# Patient Record
Sex: MALE | Race: BLACK OR AFRICAN AMERICAN | Employment: UNEMPLOYED | URBAN - METROPOLITAN AREA
[De-identification: names, ages, dates, MRNs, and addresses within clinical notes are randomized per-mention and may not be internally consistent; named-entity substitution may affect disease eponyms.]

---

## 2019-07-06 ENCOUNTER — APPOINTMENT (OUTPATIENT)
Dept: CT IMAGING | Age: 49
End: 2019-07-06
Attending: EMERGENCY MEDICINE
Payer: MEDICAID

## 2019-07-06 ENCOUNTER — HOSPITAL ENCOUNTER (EMERGENCY)
Age: 49
Discharge: PSYCHIATRIC OR SUBSTANCE USE FACILITY | End: 2019-07-07
Attending: EMERGENCY MEDICINE
Payer: MEDICAID

## 2019-07-06 DIAGNOSIS — T07.XXXA ABRASIONS OF MULTIPLE SITES: ICD-10-CM

## 2019-07-06 DIAGNOSIS — R45.851 SUICIDE IDEATION: ICD-10-CM
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
## 2019-07-06 LAB
ANION GAP BLD CALC-SCNC: 19 MMOL/L (ref 10–20)
BUN BLD-MCNC: 16 MG/DL (ref 7–18)
CA-I BLD-MCNC: 1.14 MMOL/L (ref 1.12–1.32)
CHLORIDE BLD-SCNC: 109 MMOL/L (ref 100–108)
CO2 BLD-SCNC: 20 MMOL/L (ref 19–24)
CREAT UR-MCNC: 1.2 MG/DL (ref 0.6–1.3)
GLUCOSE BLD STRIP.AUTO-MCNC: 113 MG/DL (ref 74–106)
HCT VFR BLD CALC: 32 % (ref 36–49)
HGB BLD-MCNC: 10.9 G/DL (ref 12–16)
POTASSIUM BLD-SCNC: 3.5 MMOL/L (ref 3.5–5.5)
SODIUM BLD-SCNC: 144 MMOL/L (ref 136–145)

## 2019-07-06 PROCEDURE — 99284 EMERGENCY DEPT VISIT MOD MDM: CPT

## 2019-07-06 PROCEDURE — 80047 BASIC METABLC PNL IONIZED CA: CPT

## 2019-07-06 PROCEDURE — 70450 CT HEAD/BRAIN W/O DYE: CPT

## 2019-07-06 PROCEDURE — 72125 CT NECK SPINE W/O DYE: CPT

## 2019-07-06 PROCEDURE — 71260 CT THORAX DX C+: CPT

## 2019-07-06 PROCEDURE — 80307 DRUG TEST PRSMV CHEM ANLYZR: CPT

## 2019-07-06 PROCEDURE — 74011636320 HC RX REV CODE- 636/320: Performed by: EMERGENCY MEDICINE

## 2019-07-06 PROCEDURE — 99285 EMERGENCY DEPT VISIT HI MDM: CPT

## 2019-07-06 PROCEDURE — 85025 COMPLETE CBC W/AUTO DIFF WBC: CPT

## 2019-07-06 RX ADMIN — IOPAMIDOL 100 ML: 612 INJECTION, SOLUTION INTRAVENOUS at 23:09

## 2019-07-07 ENCOUNTER — HOSPITAL ENCOUNTER (INPATIENT)
Age: 49
LOS: 3 days | Discharge: HOME | DRG: 750 | End: 2019-07-10
Attending: PSYCHIATRY & NEUROLOGY | Admitting: PSYCHIATRY & NEUROLOGY
Payer: COMMERCIAL

## 2019-07-07 VITALS
BODY MASS INDEX: 26.66 KG/M2 | WEIGHT: 180 LBS | TEMPERATURE: 97.1 F | HEIGHT: 69 IN | DIASTOLIC BLOOD PRESSURE: 78 MMHG | SYSTOLIC BLOOD PRESSURE: 122 MMHG | HEART RATE: 63 BPM | OXYGEN SATURATION: 99 % | RESPIRATION RATE: 16 BRPM

## 2019-07-07 LAB
AMPHET UR QL SCN: NEGATIVE
BARBITURATES UR QL SCN: NEGATIVE
BASOPHILS # BLD: 0 K/UL (ref 0–0.1)
BASOPHILS NFR BLD: 0 % (ref 0–2)
BENZODIAZ UR QL: NEGATIVE
CANNABINOIDS UR QL SCN: POSITIVE
COCAINE UR QL SCN: POSITIVE
DIFFERENTIAL METHOD BLD: ABNORMAL
EOSINOPHIL # BLD: 0.1 K/UL (ref 0–0.4)
EOSINOPHIL NFR BLD: 2 % (ref 0–5)
ERYTHROCYTE [DISTWIDTH] IN BLOOD BY AUTOMATED COUNT: 13.7 % (ref 11.6–14.5)
ETHANOL SERPL-MCNC: 46 MG/DL (ref 0–3)
HCT VFR BLD AUTO: 30.4 % (ref 36–48)
HDSCOM,HDSCOM: ABNORMAL
HGB BLD-MCNC: 10.1 G/DL (ref 13–16)
LYMPHOCYTES # BLD: 2 K/UL (ref 0.9–3.6)
LYMPHOCYTES NFR BLD: 28 % (ref 21–52)
MCH RBC QN AUTO: 28.9 PG (ref 24–34)
MCHC RBC AUTO-ENTMCNC: 33.2 G/DL (ref 31–37)
MCV RBC AUTO: 86.9 FL (ref 74–97)
METHADONE UR QL: NEGATIVE
MONOCYTES # BLD: 0.8 K/UL (ref 0.05–1.2)
MONOCYTES NFR BLD: 11 % (ref 3–10)
NEUTS SEG # BLD: 4.2 K/UL (ref 1.8–8)
NEUTS SEG NFR BLD: 59 % (ref 40–73)
OPIATES UR QL: NEGATIVE
PCP UR QL: NEGATIVE
PLATELET # BLD AUTO: 272 K/UL (ref 135–420)
PMV BLD AUTO: 8.7 FL (ref 9.2–11.8)
RBC # BLD AUTO: 3.5 M/UL (ref 4.7–5.5)
WBC # BLD AUTO: 7.1 K/UL (ref 4.6–13.2)

## 2019-07-07 PROCEDURE — 65220000005 HC RM SEMIPRIVATE PSYCH 3 OR 4 BED

## 2019-07-07 PROCEDURE — 74011250637 HC RX REV CODE- 250/637: Performed by: EMERGENCY MEDICINE

## 2019-07-07 RX ORDER — HALOPERIDOL 5 MG/ML
5 INJECTION INTRAMUSCULAR
Status: DISCONTINUED | OUTPATIENT
Start: 2019-07-07 | End: 2019-07-10 | Stop reason: HOSPADM

## 2019-07-07 RX ORDER — IBUPROFEN 600 MG/1
600 TABLET ORAL
Status: DISCONTINUED | OUTPATIENT
Start: 2019-07-07 | End: 2019-07-10 | Stop reason: HOSPADM

## 2019-07-07 RX ORDER — ACETAMINOPHEN 325 MG/1
650 TABLET ORAL
Status: COMPLETED | OUTPATIENT
Start: 2019-07-07 | End: 2019-07-07

## 2019-07-07 RX ORDER — ACETAMINOPHEN 500 MG
1000 TABLET ORAL
Status: COMPLETED | OUTPATIENT
Start: 2019-07-07 | End: 2019-07-07

## 2019-07-07 RX ORDER — HYDROXYZINE PAMOATE 50 MG/1
50 CAPSULE ORAL
Status: DISCONTINUED | OUTPATIENT
Start: 2019-07-07 | End: 2019-07-10 | Stop reason: HOSPADM

## 2019-07-07 RX ORDER — HALOPERIDOL 5 MG/1
5 TABLET ORAL
Status: DISCONTINUED | OUTPATIENT
Start: 2019-07-07 | End: 2019-07-10 | Stop reason: HOSPADM

## 2019-07-07 RX ORDER — TRAZODONE HYDROCHLORIDE 50 MG/1
50 TABLET ORAL
Status: DISCONTINUED | OUTPATIENT
Start: 2019-07-07 | End: 2019-07-08

## 2019-07-07 RX ADMIN — ACETAMINOPHEN 1000 MG: 500 TABLET, FILM COATED ORAL at 00:38

## 2019-07-07 RX ADMIN — ACETAMINOPHEN 650 MG: 325 TABLET, FILM COATED ORAL at 13:44

## 2019-07-07 NOTE — PROGRESS NOTES
Spoke with Sarah Edouard from [facility] will review and awaiting Psych note. Per Sarah Edouard limited staffing at their facility. Spoke with [facility] VB will review faxed information. Spoke with Mandy Mcconnell will review faxed information. Spoke with Juanpablo and they are at [facility] with Ganesh Amaya she stated that they would be able to take him , but at this time she has had a plumbing issue arise at the facility and they ae on hold for admissions until the issue is resolved. Spoke with JARED Joao Chol and they will come to evaluate for CSU    [facility] is at bedside to evaluate for Earlis Cranker stated they are full at this time. Spoke with patient and he is willing to be submitted out to the [facility] if no bed can be found locally or if not a candidate for CSU. Per [facility] evaluation patient is not a candidate for CSU at this time. Patient remains Voluntary. [address] for [employer], [employer] and [place] to review and let us known. Received call from [facility] and their plumbing issue has been resolved and they will accept the patient. Accepting MD is Dr. Mariella Hardy , Patient is to go to Room 106 bed 2 on the [facility] unit.  Nurse to call report to [phone]
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
## 2019-07-07 NOTE — ED NOTES
Patient was for discharge and patient was being discharged and then told nurse that he was suicidal, patient in paper scrubs, sitter at bedside, tele psych in seeing miko t, patient alert and oriented x 4, POC discussed with patient
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
## 2019-07-07 NOTE — ED NOTES
Yuliet Grover from [facility] here to see patient, patient  is volentary, not able to go to crisis stablilization
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
## 2019-07-07 NOTE — ED NOTES
TRANSFER - OUT REPORT:    Verbal report given toKim Ayaan Escamilla RN(name) on Shane Manley  being transferred to [facility]) for routine progression of care       Report consisted of patients Situation, Background, Assessment and   Recommendations(SBAR). Information from the following report(s) Kardex, ED Summary, OR Summary, Intake/Output and MAR was reviewed with the receiving nurse. Lines:   Peripheral IV 07/06/19 Right Forearm (Active)        Opportunity for questions and clarification was provided.       Patient transported with: Veronica Goel 308

## 2019-07-08 ENCOUNTER — APPOINTMENT (OUTPATIENT)
Dept: GENERAL RADIOLOGY | Age: 49
DRG: 750 | End: 2019-07-08
Attending: PSYCHIATRY & NEUROLOGY
Payer: COMMERCIAL

## 2019-07-08 PROBLEM — F32.A DEPRESSION: Status: ACTIVE | Noted: 2019-07-08

## 2019-07-08 PROBLEM — F20.9 SCHIZOPHRENIA (HCC): Chronic | Status: ACTIVE | Noted: 2019-07-08

## 2019-07-08 PROCEDURE — 74011250637 HC RX REV CODE- 250/637: Performed by: PSYCHIATRY & NEUROLOGY

## 2019-07-08 PROCEDURE — 65220000005 HC RM SEMIPRIVATE PSYCH 3 OR 4 BED

## 2019-07-08 PROCEDURE — 73060 X-RAY EXAM OF HUMERUS: CPT

## 2019-07-08 RX ORDER — MIRTAZAPINE 15 MG/1
15 TABLET, FILM COATED ORAL
Status: DISCONTINUED | OUTPATIENT
Start: 2019-07-08 | End: 2019-07-10 | Stop reason: HOSPADM

## 2019-07-08 RX ORDER — QUETIAPINE FUMARATE 300 MG/1
300 TABLET, FILM COATED ORAL
Status: DISCONTINUED | OUTPATIENT
Start: 2019-07-08 | End: 2019-07-10 | Stop reason: HOSPADM

## 2019-07-08 RX ORDER — TRAZODONE HYDROCHLORIDE 50 MG/1
50 TABLET ORAL
Status: DISCONTINUED | OUTPATIENT
Start: 2019-07-08 | End: 2019-07-10 | Stop reason: HOSPADM

## 2019-07-08 RX ADMIN — MIRTAZAPINE 15 MG: 15 TABLET, FILM COATED ORAL at 20:09

## 2019-07-08 RX ADMIN — QUETIAPINE FUMARATE 300 MG: 300 TABLET ORAL at 20:09

## 2019-07-08 RX ADMIN — HYDROXYZINE PAMOATE 50 MG: 50 CAPSULE ORAL at 14:44

## 2019-07-08 RX ADMIN — IBUPROFEN 600 MG: 600 TABLET ORAL at 07:08

## 2019-07-08 RX ADMIN — IBUPROFEN 600 MG: 600 TABLET ORAL at 14:44

## 2019-07-08 RX ADMIN — IBUPROFEN 600 MG: 600 TABLET ORAL at 20:10

## 2019-07-08 RX ADMIN — TRAZODONE HYDROCHLORIDE 50 MG: 50 TABLET ORAL at 20:09

## 2019-07-08 NOTE — BH NOTES
GROUP THERAPY PROGRESS NOTE    Sherrill Momin is participating in [place] [facility]. Group time: 15 minutes    Personal goal for participation:   Orientation to the unit    Goal orientation: community    Therapeutic interventions reviewed and discussed:   Unit guidelines and daily routine were reviewed. Patients were encouraged to fill out their daily menus and set a daily goal.   Patients were given the opportunity to ask questions and voice their concerns about the unit. Impression of participation:   Andrea Loera appeared attentive during [place]. He did not have any questions or concerns about the unit.
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
## 2019-07-08 NOTE — BH NOTES
Wound on left side of pt head was cleaned and re-bandaged w/ dry dressing. Abrasion was assessed, no s/s of infection. Abrasion was cleaned with wound .   Will continue to monitor

## 2019-07-08 NOTE — BSMART NOTE
OCCUPATIONAL THERAPY PROGRESS NOTE Group Time:  1300 Attendance: The patient attended 1/3 of group. The patient left and returned to activity at least once. Called out and returned. Participation: The patient participated with minimal elaboration in the activity. Attention: The patient was able to focus on the activity. Monserrat Thomas Participated as called on.,  Answers appropriate. Stated he needed to work on \"Don't sweat the small stuff\" and discussed this briefly.

## 2019-07-08 NOTE — BSMART NOTE
SW assessment/Intervention:  Patient is currently not available and is away from the unit for testing. SW will continue to follow up with patient as needed. Kimberly Harris LCSW-SUSANNAH

## 2019-07-08 NOTE — BH NOTES
MHT NOTE: The pt has been in his room appearing to be asleep and has been observed out in the day area quiet and sitting to himself, although very little. The pt attended breakfast, lunch and two groups. He has complied with performing his ADL's, abiding by the unit rules and utilizing the non-skid footwear that was provided for his safety. He did not experience any falls. He did ask this writer if he can discharge and he was advised that he would have to go through the nurse and the doctor. The pt has not voiced any thoughts of SI, HI and has not experienced A/VH. He will continue to be monitored for his behavior, location and safety for the remaining duration of this shift.

## 2019-07-09 PROCEDURE — 74011250637 HC RX REV CODE- 250/637: Performed by: PSYCHIATRY & NEUROLOGY

## 2019-07-09 PROCEDURE — 65220000005 HC RM SEMIPRIVATE PSYCH 3 OR 4 BED

## 2019-07-09 RX ORDER — LISINOPRIL 5 MG/1
5 TABLET ORAL DAILY
Status: DISCONTINUED | OUTPATIENT
Start: 2019-07-09 | End: 2019-07-10

## 2019-07-09 RX ADMIN — IBUPROFEN 600 MG: 600 TABLET ORAL at 18:05

## 2019-07-09 RX ADMIN — QUETIAPINE FUMARATE 300 MG: 300 TABLET ORAL at 20:17

## 2019-07-09 RX ADMIN — IBUPROFEN 600 MG: 600 TABLET ORAL at 07:08

## 2019-07-09 RX ADMIN — TRAZODONE HYDROCHLORIDE 50 MG: 50 TABLET ORAL at 20:17

## 2019-07-09 RX ADMIN — LISINOPRIL 5 MG: 5 TABLET ORAL at 17:48

## 2019-07-09 RX ADMIN — MULTIPLE VITAMINS W/ MINERALS TAB 1 TABLET: TAB at 08:13

## 2019-07-09 RX ADMIN — MIRTAZAPINE 15 MG: 15 TABLET, FILM COATED ORAL at 20:17

## 2019-07-09 RX ADMIN — IBUPROFEN 600 MG: 600 TABLET ORAL at 13:00
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
## 2019-07-09 NOTE — PROGRESS NOTES
Problem: Falls - Risk of  Goal: *Absence of Falls  Description  Document Milta Raring Fall Risk and appropriate interventions in the flowsheet daily. Outcome: Progressing Towards Goal     Problem: Suicide/Homicide (Adult/Pediatric)  Goal: *STG: Remains safe in hospital  Description  Patient will be assessed daily for safety. Outcome: Progressing Towards Goal  Goal: *STG: Seeks staff when feelings of self harm or harm towards others arise  Description  Patient will be assessed daily for suicidal thoughts and contract for safety. Outcome: Progressing Towards Goal  Goal: *STG/LTG: Complies with medication therapy  Description  Patient will take prescribed medications daily. Outcome: Progressing Towards Goal   Patient denies suicidal ideations. States he is not hearing voices. Medication compliant. Attend select groups. Dressings changed to forehead and knee.
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
## 2019-07-09 NOTE — BH NOTES
GROUP THERAPY PROGRESS NOTE    Collin Granados is participating in Leisure Activity Group.      Group time: 39 Minutes     Personal goal for participation: To find time as a free time and being creative. Improve self-esteem, be more confident and experience new things and new activities. Develop positive social interaction skills, develop friendships,improve social skills and to be active.      Goal orientation: Social     Group therapy participation: Active     Therapeutic interventions reviewed and discussed: The importance of having a leisure creativity and the quality of spending free time as a group participating in different activities or just an individual (a person) relaxing and enjoying leisure.     Impression of participation: Patient has fully participated.

## 2019-07-09 NOTE — BH NOTES
GROUP THERAPY PROGRESS NOTE    Sherrill Momin is participating in [place].      Group time: 45 minutes    Personal goal for participation: \"be positive, get place to stay\"    Goal orientation: personal    Group therapy participation: active    Therapeutic interventions reviewed and discussed: discuss daily Tx goal(s); discuss guideline compliance, unit issues and community announcements

## 2019-07-09 NOTE — PROGRESS NOTES
Behavioral Health Progress Note    Admit Date: 7/7/2019  Hospital day 1    Vitals :   Patient Vitals for the past 8 hrs:   BP Temp Pulse Resp   07/09/19 0730 (!) 147/96 97.6 °F (36.4 °C) 72 18     Labs:  No results found for this or any previous visit (from the past 24 hour(s)). Meds:   Current Facility-Administered Medications   Medication Dose Route Frequency    traZODone (DESYREL) tablet 50 mg  50 mg Oral QHS    mirtazapine (REMERON) tablet 15 mg  15 mg Oral QHS    QUEtiapine (SEROquel) tablet 300 mg  300 mg Oral QHS    multivitamin, tx-iron-ca-min (THERA-M w/ IRON) tablet 1 Tab  1 Tab Oral DAILY    haloperidol lactate (HALDOL) injection 5 mg  5 mg IntraMUSCular Q6H PRN    haloperidol (HALDOL) tablet 5 mg  5 mg Oral Q6H PRN    hydrOXYzine pamoate (VISTARIL) capsule 50 mg  50 mg Oral Q4H PRN    ibuprofen (MOTRIN) tablet 600 mg  600 mg Oral Q6H PRN      Hospital Problems: Principal Problem:    Schizophrenia ([facility] [address] (7/8/2019)        Subjective:   Medication side effects: none  none    Mental Status Exam  Sensorium: alert  Orientation: only aware of  time, place and person  Relations: cooperative  Eye Contact: appropriate  Appearance: shows no evidence of impairment  Thought Process: normal rate of thoughts   Thought Content: no evidence of impairment   Suicidal: denies   Homicidal: none   Mood: is euthymic   Affect: stable  Memory: shows no evidence of impairment     Concentration: intact  Abstraction: concrete  Insight: The patient shows little insight    OR Fair  Judgement: is psychologically impaired OR  Fair    Assessment/Plan:   not changed    Pt seen by wound care nurse. Says he does not have sutures, more abrasions and lacerations. Not infected. Recommended cleanse with soap and water and dress. Knee is a little stiff. No confusion. He talked to his payee. No money in account until Aug 1. They paid his July rent in [facility] where he had been. Denies halluc, delusions, HI or SI.  Says he wants list of rooming houses in area. Continue meds as is.

## 2019-07-09 NOTE — BSMART NOTE
OCCUPATIONAL THERAPY PROGRESS NOTE Group Time:  1267 Attendance: The patient attended full group. Cristóbal Nobles Participation: The patient participated with minimal elaboration in the activity. . 
Attention: The patient needed redirection to activity at least once. Cristóbal Nobles Interaction: The patient acknowledges others or responds to questions,  with no spontaneous interaction. Responds to questions with minimal spontaneous elaboration. Responses on subject and appropriate to question.

## 2019-07-09 NOTE — BSMART NOTE
ART THERAPY GROUP PROGRESS NOTE PATIENT SCHEDULED FOR GROUP AT: 10:00 
 
ATTENDANCE: 1/2 PARTICIPATION LEVEL: Participates fully in the art process ATTENTION LEVEL : Able to focus on task FOCUS: Anxiety reduction/ mindfulness SYMBOLIC & THEMATIC CONTENT AS NOTED IN IMAGERY: He was calm, compliant, and alert. His thought process was linear and associations logical. He followed directives accordingly and kept to himself unless directly prompted. He was called out for half of group to re-dress his bandages.

## 2019-07-09 NOTE — BH NOTES
GROUP THERAPY PROGRESS NOTE    Dara Borges is participating in Coping skills educational group. Group time: 30 minutes    Personal goal for participation: Identify at least 2 coping skills to utilize with increased stressors. Goal orientation: community    Group therapy participation: active    Therapeutic interventions reviewed and discussed: Identifying coping skills to utilize when presented with increased stressors.       Impression of participation: calm

## 2019-07-09 NOTE — BH NOTES
Patient spent most of her day in bed resting, she participated in group today,she  ate only 10% of her dinner took her medicine and returned to her room. Staff will continue patient for safety.

## 2019-07-09 NOTE — BSMART NOTE
SOCIAL WORK GROUP THERAPY PROGRESS NOTE Time: 4:00 Group Topic: DISCHARGE PLANNING AND MAINTAINING STABILITY IN THE COMMUNITY Group Participation: SW encouraged pt to participate in group. \" I went to group earlier today\". LEIDY informed pt , the group was different  Form the one he attended earlier. Pt.  Still declined.

## 2019-07-09 NOTE — WOUND CARE
Physical Exam  
Room 106/02: wound assess Wound Head Anterior; Left Abrasion  (Active) 3 abrasions: 2.5x1x0.1 cm; 3.5x2x0.1cm; 4x2x0.1cm. POA Dressing Status Intact; Removed 7/9/2019 10:55 AM  
Dressing Type 4 x 4;Transparent film 7/9/2019 10:55 AM  
Non-staged Wound Description Partial thickness 7/9/2019 10:55 AM  
Shape irregular 7/9/2019 10:55 AM  
Condition of Base [place] 7/9/2019 10:55 AM  
Condition of Edges Closed 7/9/2019 10:55 AM  
Epithelialization (%) 0 7/9/2019 10:55 AM  
Assessment Dark edges 7/9/2019 10:55 AM  
Tissue Type Percent Pink 100 7/9/2019 10:55 AM  
Drainage Amount Small 7/9/2019 10:55 AM  
Drainage Color Serosanguinous 7/9/2019 10:55 AM  
Wound Odor None 7/9/2019 10:55 AM  
Deyanira-wound Assessment Intact 7/9/2019 10:55 AM  
Margins Attached edges; Defined edges 7/9/2019 10:55 AM  
Dressing Changed Changed/New 7/9/2019 10:55 AM  
Dressing Type Applied Silicone 1/0/6399 18:93 AM  
Procedure Tolerated Well 7/9/2019 10:55 AM  
Number of days: 3 Wound Knee Left abrasion (Active) POA Dressing Status Intact; Removed 7/9/2019 10:55 AM  
Dressing Type Non adherent 7/9/2019 10:55 AM  
Non-staged Wound Description Partial thickness 7/9/2019 10:55 AM  
Shape circular 7/9/2019 10:55 AM  
Wound Length (cm) 7 cm 7/9/2019 10:55 AM  
Wound Width (cm) 4 cm 7/9/2019 10:55 AM  
Wound Depth (cm) 0.1 cm 7/9/2019 10:55 AM  
Wound Volume (cm^3) 2.8 cm^3 7/9/2019 10:55 AM  
Condition of Base Pink;Purple 7/9/2019 10:55 AM  
Condition of Edges Closed 7/9/2019 10:55 AM  
Epithelialization (%) 0 7/9/2019 10:55 AM  
Assessment Pink; purple 7/9/2019 10:55 AM  
Tissue Type Percent Maroon/Purple 50 % 7/9/2019 10:55 AM  
Tissue Type Percent Pink 50 7/9/2019 10:55 AM  
Drainage Amount Small 7/9/2019 10:55 AM  
Drainage Color Yellow 7/9/2019 10:55 AM  
Wound Odor None 7/9/2019 10:55 AM  
Deyanira-wound Assessment Intact 7/9/2019 10:55 AM  
Margins Attached edges; Defined edges 7/9/2019 10:55 AM  
 Dressing Changed Changed/New 7/9/2019 10:55 AM  
Dressing Type Applied Silicone 9/0/0951 73:62 AM  
Procedure Tolerated Well 7/9/2019 10:55 AM  
Number of days: 3 Recommend pt shower with next dressing change. mepilex dressings applied, education provided to pt & primary nurse Stephany Coyle RN at this time. Will turn over care to nursing staff at this time.  
Joseph JAQUEZN, RN, [facility], [address]

## 2019-07-10 VITALS
TEMPERATURE: 97.4 F | RESPIRATION RATE: 16 BRPM | WEIGHT: 175 LBS | SYSTOLIC BLOOD PRESSURE: 141 MMHG | BODY MASS INDEX: 23.7 KG/M2 | DIASTOLIC BLOOD PRESSURE: 89 MMHG | HEART RATE: 62 BPM | HEIGHT: 72 IN

## 2019-07-10 PROCEDURE — 74011250637 HC RX REV CODE- 250/637: Performed by: PSYCHIATRY & NEUROLOGY

## 2019-07-10 RX ORDER — MIRTAZAPINE 15 MG/1
15 TABLET, FILM COATED ORAL
Qty: 14 TAB | Refills: 0 | Status: SHIPPED | OUTPATIENT
Start: 2019-07-10

## 2019-07-10 RX ORDER — QUETIAPINE FUMARATE 300 MG/1
300 TABLET, FILM COATED ORAL
Qty: 14 TAB | Refills: 0 | Status: SHIPPED | OUTPATIENT
Start: 2019-07-10

## 2019-07-10 RX ORDER — DIPHENHYDRAMINE HCL 25 MG
25 CAPSULE ORAL
Status: COMPLETED | OUTPATIENT
Start: 2019-07-10 | End: 2019-07-10

## 2019-07-10 RX ORDER — TRAZODONE HYDROCHLORIDE 50 MG/1
50 TABLET ORAL
Qty: 14 TAB | Refills: 0 | Status: SHIPPED | OUTPATIENT
Start: 2019-07-10

## 2019-07-10 RX ADMIN — IBUPROFEN 600 MG: 600 TABLET ORAL at 02:23

## 2019-07-10 RX ADMIN — IBUPROFEN 600 MG: 600 TABLET ORAL at 08:35

## 2019-07-10 RX ADMIN — MULTIPLE VITAMINS W/ MINERALS TAB 1 TABLET: TAB at 08:32

## 2019-07-10 RX ADMIN — DIPHENHYDRAMINE HYDROCHLORIDE 25 MG: 25 CAPSULE ORAL at 10:41

## 2019-07-10 NOTE — BH NOTES
GROUP THERAPY PROGRESS NOTE    Khari Álvarez is participating in [facility]. Group time: 30 minutes    Personal goal for participation: \"I am just ready to go see these places before a certain time\"    Goal orientation: personal    Group therapy participation: active    Therapeutic interventions reviewed and discussed: alert    Impression of participation: He was educated on the discharge process and learning new coping skills to help him when he is discharged.

## 2019-07-10 NOTE — BH NOTES
GROUP THERAPY PROGRESS NOTE    Kassi Mathew is participating in [place]. Group time: 30 minutes    Personal goal for participation: Discuss with peers and staff whether or not pts reached their personal goals for the day. Goal orientation: personal    Group therapy participation: active    Therapeutic interventions reviewed and discussed: Explain to pts the importance of setting goals at this facility can serve as motivation to return back home at a higher level of functioning, helping improve quality of life. Impression of participation: Pt met his goals for the day of remaining positive even while receiving some news pt did not want to hear. Pt is still working on second goal of finding housing with .

## 2019-07-10 NOTE — PROGRESS NOTES
Spirituality Group was attended by Jossie Ross, who is a 51 y.o.,male. Patients Primary Language is: [place]. According to the patients EMR Scientologist Affiliation is: [place]. The reason the Patient came to the hospital is:   Patient Active Problem List    Diagnosis Date Noted    Schizophrenia [facility]) 07/08/2019         conducted Spirituality Group and explored themes on the topic of  \"loving oneself. \" We looked at the Bible's commandment to love your neighbor \"as yourself. \" We talked about ways to care for ourselves, including how to care for ourselves emotionally. How can I care for myself when I feel sad, glad, mad, scared, or hurt? Listened empathically and offered a safe place to share beliefs and feelings. Provided information about Spiritual Care Services. Kourtney Lau said love means to him responsibility, being a person of your word, and being dependable. He likes to meditate when he is feeling overwhelmed by emotions and says that solitude and quiet help him.      [address] [facility]   [phone]

## 2019-07-10 NOTE — BH NOTES
Treatment team met -     Medical Director: __x___present   Psychiatrist: __x___present   Charge nurse: __x___present   MSW: __x___present   : _____present   Nurse Manager: _x____present   Student RNs: _____present   Medical Students: _____present   Art Therapist: _x____present   Clinical Coordinator: __x___present    Occupational Therapist: _x____present   : _______ present  UR  __x_____ present  Crisis Supervisor___x____present      Plan of care discussed and updated as appropriate. For discharge today.

## 2019-07-10 NOTE — DISCHARGE INSTRUCTIONS
BEHAVIORAL HEALTH NURSING DISCHARGE NOTE      The following personal items collected during your admission are returned to you:   Dental Appliance: Dental Appliances: None  Vision: Visual Aid: None  Hearing Aid:    Jewelry: Jewelry: None  Clothing: Clothing: Pants, Shirt, Footwear  Other Valuables: Other Valuables: Cell Phone, Wallet, Keys(2 cellphone cracked. 1cellphone locker, pills, keys 126/2)  Valuables sent to safe: Personal Items Sent to Safe: two MC, 2plasma, 1 visa card      PATIENT INSTRUCTIONS:      Regular diet        The discharge information has been reviewed with the patient. The patient verbalized understanding.       Patient armband removed and shredded

## 2019-07-10 NOTE — BH NOTES
Patient is cooperative, patient takes medication as prescribed. Patient gets along with peers, patient goes to group therapy. Patient takes medications as prescribed. Patient denies thoughts of suicide, patient denies hallucinations, patient denies delusions, will continue to monitor.

## 2019-07-10 NOTE — BSMART NOTE
SW assessment/Intervention:  Patient is prepared for discharge. Denies SI/HI. Patient denies AVH. SW is encouraged to maintain appointment with mental health provider to ensure stability.  
 
Sandie Marie LCSW-E
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
## 2022-05-25 NOTE — CT
EXAMINATION TYPE: CT brain wo con

 

DATE OF EXAM: 5/25/2022

 

COMPARISON: None

 

HISTORY: left sided numbness

 

CT DLP: 1118.4 mGycm

Automated exposure control for dose reduction was used.

 

Images obtained of the brain with no contrast.

 

Ventricles have normal size. There is no mass effect or midline shift. No sign of intracranial hemorr
rocío. There is some cortical hypodensity at the left frontal lobe convexity. The calvarium is intact.
 No fracture. There is normal aeration of the mastoid sinuses. Frontal sinuses appear normal.

 

IMPRESSION:

Left frontal lobe convexity hypodensity measures 2.5 x 1.2 cm and could be subacute or old infarct. N
o hemorrhage

## 2022-05-25 NOTE — CT
EXAMINATION TYPE: CT angio chest

 

DATE OF EXAM: 5/25/2022

 

COMPARISON: None

 

HISTORY: PE suspected. Elevated d-dimer

 

CT DLP: 260.1 mGycm

Automated exposure control for dose reduction was used.

 

CONTRAST: 

Performed with IV Contrast, patient injected with 100 mL of Isovue 370.

 

Images obtained from the thoracic inlet to the diaphragm with IV contrast. There are Three-D postproc
essed images.

 

The lungs are clear of infiltrate. No pleural effusion or pneumothorax. Heart size is normal. No valentino
cardial effusion.

 

There is no mediastinal adenopathy. There are no hilar masses. No thoracic aortic aneurysm or dissect
ion.

 

There is normal contrast opacification of the pulmonary arteries. No filling defect.

 

The thoracic spine is intact. No compression fracture. There is slight thoracic dextroscoliosis.

 

IMPRESSION:

No evidence of pulmonary embolism.
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
## 2022-05-25 NOTE — XR
EXAMINATION TYPE: XR chest 2V

 

DATE OF EXAM: 5/25/2022

 

COMPARISON: NONE

 

HISTORY: Chest pain

 

TECHNIQUE: 2 views

 

FINDINGS: Heart and mediastinum are normal. Lungs are clear. Diaphragm is normal. Bony thorax appears
 normal.

 

IMPRESSION: Normal chest.

## 2022-06-23 ENCOUNTER — HOSPITAL ENCOUNTER (INPATIENT)
Dept: HOSPITAL 47 - EC | Age: 52
LOS: 4 days | Discharge: LEFT BEFORE DISCHARGE | DRG: 885 | End: 2022-06-27
Attending: PSYCHIATRY & NEUROLOGY | Admitting: PSYCHIATRY & NEUROLOGY
Payer: MEDICAID

## 2022-06-23 VITALS — RESPIRATION RATE: 16 BRPM

## 2022-06-23 DIAGNOSIS — F33.3: Primary | ICD-10-CM

## 2022-06-23 DIAGNOSIS — R45.851: ICD-10-CM

## 2022-06-23 DIAGNOSIS — F17.290: ICD-10-CM

## 2022-06-23 DIAGNOSIS — Z56.0: ICD-10-CM

## 2022-06-23 DIAGNOSIS — Z71.6: ICD-10-CM

## 2022-06-23 DIAGNOSIS — Z86.73: ICD-10-CM

## 2022-06-23 DIAGNOSIS — Z71.51: ICD-10-CM

## 2022-06-23 DIAGNOSIS — Z20.822: ICD-10-CM

## 2022-06-23 DIAGNOSIS — Z59.00: ICD-10-CM

## 2022-06-23 DIAGNOSIS — G47.00: ICD-10-CM

## 2022-06-23 DIAGNOSIS — F41.9: ICD-10-CM

## 2022-06-23 DIAGNOSIS — Z71.41: ICD-10-CM

## 2022-06-23 DIAGNOSIS — Z91.128: ICD-10-CM

## 2022-06-23 DIAGNOSIS — I10: ICD-10-CM

## 2022-06-23 DIAGNOSIS — Z88.8: ICD-10-CM

## 2022-06-23 DIAGNOSIS — T50.906A: ICD-10-CM

## 2022-06-23 DIAGNOSIS — F10.129: ICD-10-CM

## 2022-06-23 DIAGNOSIS — Z79.899: ICD-10-CM

## 2022-06-23 DIAGNOSIS — F12.20: ICD-10-CM

## 2022-06-23 PROCEDURE — 80306 DRUG TEST PRSMV INSTRMNT: CPT

## 2022-06-23 PROCEDURE — 83036 HEMOGLOBIN GLYCOSYLATED A1C: CPT

## 2022-06-23 PROCEDURE — 99285 EMERGENCY DEPT VISIT HI MDM: CPT

## 2022-06-23 PROCEDURE — 85025 COMPLETE CBC W/AUTO DIFF WBC: CPT

## 2022-06-23 PROCEDURE — 80053 COMPREHEN METABOLIC PANEL: CPT

## 2022-06-23 PROCEDURE — 84443 ASSAY THYROID STIM HORMONE: CPT

## 2022-06-23 PROCEDURE — 87635 SARS-COV-2 COVID-19 AMP PRB: CPT

## 2022-06-23 PROCEDURE — 82075 ASSAY OF BREATH ETHANOL: CPT

## 2022-06-23 PROCEDURE — 80061 LIPID PANEL: CPT

## 2022-06-23 SDOH — ECONOMIC STABILITY - INCOME SECURITY: UNEMPLOYMENT, UNSPECIFIED: Z56.0

## 2022-06-23 SDOH — ECONOMIC STABILITY - HOUSING INSECURITY: HOMELESSNESS UNSPECIFIED: Z59.00

## 2022-06-23 NOTE — ED
General Adult HPI





- General


Chief complaint: Psychiatric Symptoms


Stated complaint: Alcohol


Time Seen by Provider: 06/23/22 19:15


Source: patient, RN notes reviewed, old records reviewed


Mode of arrival: ambulatory


Limitations: no limitations





- History of Present Illness


Initial comments: 





Patient is a 51-year-old male with past medical history remarkable for 

psychiatric illness who presents emergency Department complaining of neck and 

plans with medications, suicidal ideations, auditory hallucinations.  Patient 

states he was drinking alcohol today.  Has had thoughts of going to hurt himself

and others.  Also endorses voices telling him to do so.  Drank alcohol today but

denies any other drug use.  Denies any chest pain, shortness breath, abdominal 

pain, nausea, vomiting.  Has no other acute complaints at this time.  Presents 

for psychiatry evaluation.





- Related Data


                                  Previous Rx's











 Medication  Instructions  Recorded


 


levETIRAcetam [Keppra] 500 mg PO BID #60 tab 05/25/22











                                    Allergies











Allergy/AdvReac Type Severity Reaction Status Date / Time


 


lisinopril Allergy  Anaphylaxis Verified 06/24/22 09:35














Review of Systems


ROS Statement: 


Those systems with pertinent positive or pertinent negative responses have been 

documented in the HPI.


Review of Systems:


CONST: Denies fever 


EYES: Denies blurry vision 


ENT: Denies nasal congestion  


C/V:  Denies Chest pain


RESP: Denies shortness of breath 


GI: Denies abdominal pain 


: Denies dysuria  


SKIN: Denies rash.


MSK: Denies joint pain.


NEURO: Denies headache 


PSYCH: Denies suicidal and homicidal plans/attempts.  Denies visual 

hallucinations.  Endorses auditory hallucinations.  He endorses suicidal and 

homicidal ideations.


ROS Other: All systems not noted in ROS Statement are negative.





Past Medical History


Past Medical History: CVA/TIA, Hypertension


Additional Past Medical History / Comment(s): Brain bleed March 2022,


History of Any Multi-Drug Resistant Organisms: None Reported


Past Surgical History: Orthopedic Surgery


Additional Past Surgical History / Comment(s): wrist surgery 1995


Past Psychological History: Anxiety, Schizophrenia


Smoking Status: Current every day smoker


Past Alcohol Use History: Abuse


Past Drug Use History: Marijuana





General Exam





- General Exam Comments


Initial Comments: 





General: Appears in no acute distress.  Appears a mildly intoxicated with 

alcohol.


HEAD:  Normal with no signs of head trauma.


EYES:  PERRLA, EOMI, conjunctiva normal, no discharge.  Pupils are 3 mm and 

equal bilaterally.


ENT:  Hearing grossly intact, normal oropharynx.


RESPIRATORY:  Clear breath sounds bilaterally.  No wheezes, rales, or rhonchi.  


C/V:  Regular rate and rhythm. S1 and S2 auscultated, no edema, peripheral 

pulses 2+ and intact throughout


ABD:  Abd is soft, nontender, nondistended


EXT: Normal range of motion, no obvious deformity


SKIN:  No rashes or lesions observed on exposed skin.


NEURO: Alert and oriented 4.  No focal deficits.


Limitations: no limitations





Course


                                   Vital Signs











  06/23/22





  18:28


 


Temperature 98.0 F


 


Pulse Rate 82


 


Respiratory 16





Rate 


 


Blood Pressure 135/93


 


O2 Sat by Pulse 99





Oximetry 














Medical Decision Making





- Medical Decision Making





Based on the patient's presentation and physical exam, I do believe he requires 

psychiatric evaluation.  Suicide precautions were placed.  He was placed in 

green scrubs.  Sitter was ordered.  I do not believe that he requires any 

laboratory studies or imaging.  We will obtain a UDS as well as BAT.  BAT is 

0.154.  Patient is pending sobriety.  Patient is medically cleared otherwise.  

Patient will be evaluated by psychiatry.  Following sobriety.  Vital signs are 

within normal limits.  Disposition is pending psychiatric evaluation.





- Lab Data


Result diagrams: 


                                 06/25/22 07:01





                                 06/25/22 07:01


                                   Lab Results











  06/24/22 Range/Units





  07:21 


 


Coronavirus (PCR)  Not Detected  (Not Detectd)  














Disposition


Clinical Impression: 


 Encounter for psychiatric assessment, Suicidal ideation





Disposition: TRANSFER TO PSYCH HOSP/UNIT


Condition: Stable

## 2022-06-24 RX ADMIN — Medication SCH MG: at 08:23

## 2022-06-24 RX ADMIN — IBUPROFEN PRN MG: 600 TABLET ORAL at 21:45

## 2022-06-24 RX ADMIN — THERA TABS SCH EACH: TAB at 08:23

## 2022-06-24 RX ADMIN — NICOTINE SCH PATCH: 14 PATCH, EXTENDED RELEASE TRANSDERMAL at 08:22

## 2022-06-24 RX ADMIN — FOLIC ACID SCH MG: 1 TABLET ORAL at 08:23

## 2022-06-24 NOTE — P.HP
Psychiatric H&P





- .


H&P Date: 06/24/22


History & Physical: 


                                    Allergies











Allergy/AdvReac Type Severity Reaction Status Date / Time


 


lisinopril Allergy  Anaphylaxis Verified 06/24/22 09:35








                                   Vital Signs











Temp  98.2 F   06/24/22 09:34


 


Pulse  75   06/24/22 09:34


 


Resp  16   06/24/22 09:34


 


BP  146/88   06/24/22 09:34


 


Pulse Ox  97   06/24/22 09:34


 


FiO2      








                                 Intake & Output











 06/23/22 06/24/22 06/24/22





 18:59 06:59 18:59


 


Weight 81.193 kg  79.379 kg








                             Laboratory Last Values











Urine Opiates Screen  Not Detected  (NotDetected)   06/24/22  08:56    


 


Ur Oxycodone Screen  Not Detected  (NotDetected)   06/24/22  08:56    


 


Urine Methadone Screen  Not Detected  (NotDetected)   06/24/22  08:56    


 


Ur Propoxyphene Screen  Not Detected  (NotDetected)   06/24/22  08:56    


 


Ur Barbiturates Screen  Not Detected  (NotDetected)   06/24/22  08:56    


 


U Tricyclic Antidepress  Not Detected  (NotDetected)   06/24/22  08:56    


 


Ur Phencyclidine Scrn  Not Detected  (NotDetected)   06/24/22  08:56    


 


Ur Amphetamines Screen  Not Detected  (NotDetected)   06/24/22  08:56    


 


U Methamphetamines Scrn  Not Detected  (NotDetected)   06/24/22  08:56    


 


U Benzodiazepines Scrn  Not Detected  (NotDetected)   06/24/22  08:56    


 


Urine Cocaine Screen  Not Detected  (NotDetected)   06/24/22  08:56    


 


U Marijuana (THC) Screen  Detected  (NotDetected)  H  06/24/22  08:56    


 


Coronavirus (PCR)  Not Detected  (Not Detectd)   06/24/22  07:21    











06/24/22 15:04


IDENTIFYING DATA: Patient is a single, unemployed, 51-year-old  male with

significant history of depression and alcohol use presents to the hospital for 

suicidal ideation





HPI: Patient presented to the hospital on 06/24/2022, brought into the emergency

department by EMS for suicidal ideation and mood congruent auditory 

hallucinations that have been commanding him to kill himself.  The patient 

reports that he has been out of his medications for the past 2 weeks after being

recently discharged from [facility] on 06/08/2022.  The patient was at [facility] for 2 weeks for management of alcohol use disorder.  The patient reports 

that prior to his discharge, he was on a regimen of Seroquel, Paxil, trazodone, 

and Valium.


The patient reports that he has been feeling increasingly depressed.  He 

endorses significant symptoms of anhedonia, hopelessness, helplessness, 

decreased appetite, poor sleep, low energy, and suicidal ideation.  He reports 

that he has been having multiple thoughts and plans of suicide including riding 

his bike into traffic.  The patient does report a prior attempt at suicide by 

biking into traffic back in 2019.  The patient reports that he has been 

experiencing mood congruent auditory hallucinations that have been telling him 

he is worthless and that he should go kill himself.  He is not reporting any 

visual hallucinations.


The patient is unable to identify any particular manic episodes that occur 

outside the context of substance abuse.  He does report going 4 days with little

to no sleep however states that he was intoxicated by alcohol at the time.  He 

is unable to recall his actions or the events during that time.








PAST PSYCHIATRIC HISTORY: Patient states that he has previous diagnoses of 

schizophrenia.  The patient recalls being on a regimen of Seroquel, Paxil, 

trazodone, and Valium.  The patient reports no prior inpatient psychiatric 

hospitalization.  The patient reports that he was to follow with [facility] 

in [place].  He reports one prior attempt at suicide in 2019.





PMH:


Past Medical History: CVA/TIA, Hypertension


Additional Past Medical History / Comment(s): Brain bleed March 2022,


History of Any Multi-Drug Resistant Organisms: None Reported


Past Surgical History: Orthopedic Surgery


Additional Past Surgical History / Comment(s): wrist surgery 1995


Past Psychological History: Anxiety, Schizophrenia


Smoking Status: Current every day smoker


Past Alcohol Use History: Abuse


Past Drug Use History: Marijuana





ALLERGIES: Lisinopril





CHEMICAL DEPENDENCY HISTORY: The patient reports that his last drink was 

approximately 2 days ago and consisted of a binge episode of 6 beers.  He 

reports that prior to going to [facility], he is drinking up to 12 beers per 

day.  He got out of [facility] after a 2 week stay on June 8 this year.  He 

also reports attending AA meetings.  He smokes 4 cigars per day.  He uses 

marijuana daily.  He denies any illicit drug use.





FAMILY PSYCHIATRIC/SUBSTANCE USE HISTORY:  The patient reports that his sister 

has an unspecified mental illness.





SOCIAL HISTORY: Patient was born and raised in [place], [place].  He is single, 

never , but has 2 adult children ages 21 and 22 years of age.  He reports

an eighth grade education.  He denies any [employer] affiliation.  He reports no 

legal issues at this time.  He denies any  service.  He receives Social 

Security.





MENTAL STATUS EXAM: 


General Appearance: Patient appears to be stated age is alert, directable, and 

attempts to cooperate. Patient appears to have fair hygiene and grooming.  

Noticeable healed laceration on his forehead.


Behavior: Patient is seated without any agitated behavior.  Eye contact is 

appropriate.


Speech: Patient's speech is fluent and nonpressured.


Mood/Affect: Patient reports their mood is depressed, affect is congruent and 

blunted.


Suicidality/Homicidality:  Patient denies having any homicidal ideation intent 

or plan. But endorses suicidal ideation.


Perceptions: Patient denies any visual hallucinations but endorses auditory 

hallucinations.


Though content/process: There is no evidence of any delusional thought content 

and thought process is linear and goal-directed. 


Memory and concentration: AOX3, grossly intact for the purposes of this session.

Can spell "WORLD" backwards


Judgment and insight: Fair





STRENGTHS/WEAKNESSES: Strength is that the patient has somewhat good insight.  

Weakness is that patient engages in substance abuse and is nonadherent with 

treatment.





INTELLECT: average





IMPRESSIONS: 


Major depressive disorder, recurrent, severe, with psychotic features


Alcohol use disorder


Cannabis use disorder





PLAN: 


-Patient is admitted under voluntary status to [facility] for stabilization of 

psychiatric symptoms and safety. Patient signed adult voluntary form and 

medication consent and is placed in patient's chart. 


-Medications : Will start patient on 


Seroquel 100 mg by mouth at bedtime for mood stabilization/psychosis


Paxil 20 mg daily at bedtime for depression/anxiety


Trazodone 150 mg by mouth at bedtime for depression/insomnia


-Ativan and Haldol PRN for agitation/aggression


-CIWA protocol with Ativan PRN for ETOH withdrawal


-Patient was counselled on substance abuse and desired to cut back on use


-Patient was informed of the risks, benefits and side effects of the medication 

and patient verbally consented to taking the medications. Patient signed med 

consent form and was placed in chart.


-Internal Medicine consult to perform medical evaluation and physical.


-NRT - nicotine patch


-SW on board for discharge planning. Encourage patient to participate in groups 

to work on coping skills. 


06/24/22 15:04

## 2022-06-25 LAB
ALBUMIN SERPL-MCNC: 4 G/DL (ref 3.5–5)
ALP SERPL-CCNC: 63 U/L (ref 38–126)
ALT SERPL-CCNC: 15 U/L (ref 4–49)
ANION GAP SERPL CALC-SCNC: 4 MMOL/L
AST SERPL-CCNC: 24 U/L (ref 17–59)
BASOPHILS # BLD AUTO: 0 K/UL (ref 0–0.2)
BASOPHILS NFR BLD AUTO: 1 %
BUN SERPL-SCNC: 12 MG/DL (ref 9–20)
CALCIUM SPEC-MCNC: 8.4 MG/DL (ref 8.4–10.2)
CHLORIDE SERPL-SCNC: 111 MMOL/L (ref 98–107)
CO2 SERPL-SCNC: 27 MMOL/L (ref 22–30)
EOSINOPHIL # BLD AUTO: 0.1 K/UL (ref 0–0.7)
EOSINOPHIL NFR BLD AUTO: 4 %
ERYTHROCYTE [DISTWIDTH] IN BLOOD BY AUTOMATED COUNT: 4.02 M/UL (ref 4.3–5.9)
ERYTHROCYTE [DISTWIDTH] IN BLOOD: 14.7 % (ref 11.5–15.5)
GLUCOSE SERPL-MCNC: 83 MG/DL (ref 74–99)
HCT VFR BLD AUTO: 38.4 % (ref 39–53)
HGB BLD-MCNC: 12.6 GM/DL (ref 13–17.5)
LYMPHOCYTES # SPEC AUTO: 1.2 K/UL (ref 1–4.8)
LYMPHOCYTES NFR SPEC AUTO: 38 %
MCH RBC QN AUTO: 31.4 PG (ref 25–35)
MCHC RBC AUTO-ENTMCNC: 32.8 G/DL (ref 31–37)
MCV RBC AUTO: 95.6 FL (ref 80–100)
MONOCYTES # BLD AUTO: 0.3 K/UL (ref 0–1)
MONOCYTES NFR BLD AUTO: 9 %
NEUTROPHILS # BLD AUTO: 1.5 K/UL (ref 1.3–7.7)
NEUTROPHILS NFR BLD AUTO: 46 %
PLATELET # BLD AUTO: 246 K/UL (ref 150–450)
POTASSIUM SERPL-SCNC: 4 MMOL/L (ref 3.5–5.1)
PROT SERPL-MCNC: 6.9 G/DL (ref 6.3–8.2)
SODIUM SERPL-SCNC: 142 MMOL/L (ref 137–145)
WBC # BLD AUTO: 3.3 K/UL (ref 3.8–10.6)

## 2022-06-25 RX ADMIN — NICOTINE SCH PATCH: 14 PATCH, EXTENDED RELEASE TRANSDERMAL at 08:15

## 2022-06-25 RX ADMIN — THERA TABS SCH EACH: TAB at 08:16

## 2022-06-25 RX ADMIN — Medication SCH MG: at 08:15

## 2022-06-25 RX ADMIN — IBUPROFEN PRN MG: 600 TABLET ORAL at 21:12

## 2022-06-25 RX ADMIN — FOLIC ACID SCH MG: 1 TABLET ORAL at 08:15

## 2022-06-25 RX ADMIN — IBUPROFEN PRN MG: 600 TABLET ORAL at 08:17

## 2022-06-25 NOTE — P.PN
Progress Note - Text


Progress Note Date: 06/25/22











Subjective:


Patient was seen today as a cross coverage for . The patient was 

evaluated, chart reviewed, case discussed with the treatment team.  Patient 

reports good sleep last night, and appetite was reported as " fair".  Patient 

has been going to groups and other unit activities. The patient is compliant 

with his medications and denies any adverse reactions. Patient reports 

improvement of  his mood today, thinking positive, plans to go to [facility] after discharge. He denies any suicidal thoughts, 

hallucinations, delusions or paranoia 





Objective:





Vitals has been reviewed.





Mental status examination; 


Appearance: The patient appears stated age, fairly groomed,  average body built,

no specific features.


Gait/posture:normal gait, Normal arm  swinging: No abnormal movements.


Attitude and behavior:  engaged,  cooperative, fair eye contact.


Motor activity: normal psychomotor activity


Speech: normal rate, rhythm and articulation


Mood:"better" 


Affect:restricted


Thought form: goal-directed, linear,  coherent.


Thought content: Non-delusional, denies suicidal thoughts, denies homicidal 

thoughts, denies intentions or plans. 


Perception: Denies any auditory or visual hallucinations


Attention: No impairment.  Patient was able to repeat serial 5.  


Orientation: Patient is oriented to time place person and situation.


Insight: Patient has fair insight about his psychiatric disorder.


Judgment: Patient has fair judgment about his psychiatric treatment.





Assessment:


Major depressive disorder, recurrent, severe, with psychotic features


Alcohol use disorder


Cannabis use disorder





Plan:


Continue inpatient level of care due to needs further monitoring  and 

stabilization of depression symptoms 


Precautions: Continue 15 minutes check for safety.


Consider medical consultation if any acute medical issues arise.


Provide the patient individual, group therapy, substance use disorder counseling

to give better insight and learn coping skills.





Medications:


Seroquel 100 mg by mouth at bedtime for mood stabilization/psychosis


Paxil 20 mg daily at bedtime for depression/anxiety


Trazodone 150 mg by mouth at bedtime for depression/insomnia





Ativan and Haldol PRN for agitation/aggression


CIWA protocol with Ativan PRN for ETOH withdrawal- NO withdrawal symptoms 

reported 


Patient was counselled on substance abuse and desired to cut back on use


NRT - nicotine patch





Continue non-psychiatric medications for medical conditions as recommended by 

the medical team. 





Discharge patient to OUTPATIENT services upon a stabilization.

## 2022-06-26 LAB
CHOLEST SERPL-MCNC: 151 MG/DL (ref 0–200)
HDLC SERPL-MCNC: 49.2 MG/DL (ref 40–60)
LDLC SERPL CALC-MCNC: 58.4 MG/DL (ref 0–131)
TRIGL SERPL-MCNC: 217 MG/DL (ref 0–149)
VLDLC SERPL CALC-MCNC: 43.4 MG/DL (ref 5–40)

## 2022-06-26 RX ADMIN — IBUPROFEN PRN MG: 600 TABLET ORAL at 21:26

## 2022-06-26 RX ADMIN — FOLIC ACID SCH MG: 1 TABLET ORAL at 08:05

## 2022-06-26 RX ADMIN — THERA TABS SCH EACH: TAB at 08:06

## 2022-06-26 RX ADMIN — Medication SCH MG: at 08:06

## 2022-06-26 RX ADMIN — NICOTINE SCH PATCH: 14 PATCH, EXTENDED RELEASE TRANSDERMAL at 08:05

## 2022-06-26 NOTE — P.PN
Progress Note - Text


Progress Note Date: 06/26/22











Subjective:


Patient was seen today as a cross coverage for . The patient was 

evaluated, chart reviewed, case discussed with the treatment team.  Patient 

reported feeling a stability of his mood and he denied depression symptoms, 

hopelessness, or suicidal ideation.  Patient denied any hallucinations, paranoid

ideation, delusions, or manic symptoms.  Patient continued to report fair sleep 

and appetite.  He did go to selected groups and other unit activities.  The angelo

ent is compliant with his medications and denies any adverse reactions.  He 

reported wanting to discharge aftercare plan with his  tomorrow 

that he wants to go to [facility] for outpatient treatment.





Objective:





Vitals has been reviewed.





Mental status examination; 


Appearance: The patient appears stated age, fairly groomed,  average body built,

no specific features.


Gait/posture:normal gait, Normal arm  swinging: No abnormal movements.


Attitude and behavior:  engaged,  cooperative, fair eye contact.


Motor activity: normal psychomotor activity


Speech: normal rate, rhythm and articulation


Mood:"better" 


Affect:restricted


Thought form: goal-directed, linear,  coherent.


Thought content: Non-delusional, denies suicidal thoughts, denies homicidal 

thoughts, denies intentions or plans. 


Perception: Denies any auditory or visual hallucinations


Attention: No impairment.  


Orientation: Patient is oriented to time place person and situation.


Insight: Patient has fair insight about his psychiatric disorder.


Judgment: Patient has fair judgment about his psychiatric treatment.





Assessment:


Major depressive disorder, recurrent, severe, with psychotic features


Alcohol use disorder


Cannabis use disorder





Plan:


Continue inpatient level of care due to needs further monitoring  and 

stabilization of depression symptoms 


Precautions: Continue 15 minutes check for safety.


Consider medical consultation if any acute medical issues arise.


Provide the patient individual, group therapy, substance use disorder counseling

to give better insight and learn coping skills.





Medications:


Seroquel 100 mg by mouth at bedtime for mood stabilization/psychosis


Paxil 20 mg daily at bedtime for depression/anxiety


Trazodone 150 mg by mouth at bedtime for depression/insomnia





Ativan and Haldol PRN for agitation/aggression


CIWA protocol with Ativan PRN for ETOH withdrawal- NO withdrawal symptoms 

reported 


Patient was counselled on substance abuse and desired to cut back on use


NRT - nicotine patch





Continue non-psychiatric medications for medical conditions as recommended by 

the medical team. 





Discharge patient to OUTPATIENT services upon a stabilization.

## 2022-06-27 VITALS — HEART RATE: 69 BPM | DIASTOLIC BLOOD PRESSURE: 87 MMHG | TEMPERATURE: 98 F | SYSTOLIC BLOOD PRESSURE: 157 MMHG

## 2022-06-27 RX ADMIN — Medication SCH MG: at 09:04

## 2022-06-27 RX ADMIN — THERA TABS SCH EACH: TAB at 09:04

## 2022-06-27 RX ADMIN — NICOTINE SCH: 14 PATCH, EXTENDED RELEASE TRANSDERMAL at 09:04

## 2022-06-27 RX ADMIN — FOLIC ACID SCH MG: 1 TABLET ORAL at 09:04
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
## 2022-06-27 NOTE — P.MDCNMH
History of Present Illness


H&P Date: 06/26/22


Chief Complaint: suicidal ideation





51 year old male , reports history of schizophrenia and denies any past medical 

history 





provides limited history , denies auditory hallucinations, or visual 

hallucinations, reports depressed mood and suicidal ideation. denies any medical

concerns, denies any fever, chills, URI symptoms , denies changes in urinary or 

bowel habits, denies chest pain or trouble breathing 





Review of Systems





 





Pertinent positives as noted in HPI. All other systems were reviewed and are 

negative 





Past Medical History


Past Medical History: CVA/TIA, Hypertension


Additional Past Medical History / Comment(s): Brain bleed March 2022,


History of Any Multi-Drug Resistant Organisms: None Reported


Past Surgical History: Orthopedic Surgery


Additional Past Surgical History / Comment(s): wrist surgery 1995


Past Psychological History: Anxiety, Schizophrenia


Smoking Status: Current every day smoker


Past Alcohol Use History: Abuse


Past Drug Use History: Marijuana





- Past Family History


  ** family


Family Medical History: No Reported History





Medications and Allergies


                                Home Medications











 Medication  Instructions  Recorded  Confirmed  Type


 


levETIRAcetam [Keppra] 500 mg PO BID #60 tab 05/25/22 06/24/22 Rx








                                    Allergies











Allergy/AdvReac Type Severity Reaction Status Date / Time


 


lisinopril Allergy  Anaphylaxis Verified 06/24/22 09:35














Physical Exam


Vitals: 


                                Intake and Output











 06/26/22 06/26/22 06/27/22





 14:59 22:59 06:59


 


Other:   


 


  Weight 81.5 kg  














 Constitutional:          No acute distress, cooperative


Eyes:      Anicteric sclerae, moist conjunctiva, 


         Pupils equal round reactive to light





ENMT:      NC/AT


         Oropharynx clear, no erythema, or exudates





Neck:      Supple, , no masses, or JVD


         No carotid bruits


         No thyromegaly





Lungs:      Clear to auscultation


         Clear to percussion


         Normal respiratory effort, no accessory muscle use 





Cardiovascular:      Heart regular in rate and rhythm, 


         No murmurs, gallops, or rubs


         No peripheral edema





 





Extremities:      No digital cyanosis 


          clubbing bilateral fingers


         Pedal pulses intact and symmetrical


         Radial pulses intact and symmetrical 


         No calf tenderness 





Psychiatric:      Alert and oriented to person, place and time


          


Neuro      Muscles Strength 5/5 in all 4 extremities 


         Sensation to light touch grossly present throughout


         Cranial nerves II-XII grossly intact


           





Cranial Nerve Examination





- Cranial Nerves


Cranial Nerve II- Optic: Intact


Cranial Nerve III- Oculomotor: Intact


Cranial Nerve IV- Trochlear: Intact


Cranial Nerve V- Trigeminal: Intact


Cranial Nerve VI- Abducens: Intact


Cranial Nerve VII- Facial: Intact


Cranial Nerve VIII- Auditory: Intact


Cranial Nerve IX- Glossopharyngeal: Intact


Cranial Nerve X- Vagus: Intact


Cranial Nerve XI- Accessory: Intact


Cranial Nerve XII- Hypoglossal: Intact





Results


CBC & Chem 7: 


                                 06/25/22 07:01





                                 06/25/22 07:01


Labs: 


                  Abnormal Lab Results - Last 24 Hours (Table)











  06/25/22 Range/Units





  07:01 


 


Triglycerides  217.00 H  (0..00)  mg/dL


 


VLDL Cholesterol, Calc  43.40 H  (5.00-40.00)  mg/dL














Assessment and Plan


Assessment: 





depression and suicidal ideation


schizophrenia 


management per psych 





leukopenia , consider OP follow up with PCP , to repeat labs after discharge


monitor vital signs for any sings of fever. 





Thank you for allowing us to participate in the care of this patient.    Do not 

hesitate to contact us with questions.  Someone can be reached from the [facility] hospitalist group at all hours of the day at [phone].